# Patient Record
Sex: MALE | Race: OTHER | Employment: FULL TIME | URBAN - METROPOLITAN AREA
[De-identification: names, ages, dates, MRNs, and addresses within clinical notes are randomized per-mention and may not be internally consistent; named-entity substitution may affect disease eponyms.]

---

## 2024-04-14 ENCOUNTER — APPOINTMENT (OUTPATIENT)
Dept: CT IMAGING | Facility: HOSPITAL | Age: 54
End: 2024-04-14
Attending: EMERGENCY MEDICINE
Payer: COMMERCIAL

## 2024-04-14 ENCOUNTER — HOSPITAL ENCOUNTER (EMERGENCY)
Facility: HOSPITAL | Age: 54
Discharge: HOME | End: 2024-04-14
Attending: EMERGENCY MEDICINE
Payer: COMMERCIAL

## 2024-04-14 VITALS
HEART RATE: 56 BPM | HEIGHT: 68 IN | DIASTOLIC BLOOD PRESSURE: 91 MMHG | OXYGEN SATURATION: 98 % | SYSTOLIC BLOOD PRESSURE: 127 MMHG | RESPIRATION RATE: 12 BRPM | BODY MASS INDEX: 25.76 KG/M2 | TEMPERATURE: 98 F | WEIGHT: 170 LBS

## 2024-04-14 DIAGNOSIS — I10 HYPERTENSION, UNSPECIFIED TYPE: ICD-10-CM

## 2024-04-14 DIAGNOSIS — J01.90 ACUTE SINUSITIS, RECURRENCE NOT SPECIFIED, UNSPECIFIED LOCATION: Primary | ICD-10-CM

## 2024-04-14 LAB
ALBUMIN SERPL-MCNC: 3.9 G/DL (ref 3.4–5)
ALBUMIN/GLOB SERPL: 1 {RATIO} (ref 1–2)
ALP LIVER SERPL-CCNC: 88 U/L
ALT SERPL-CCNC: 26 U/L
ANION GAP SERPL CALC-SCNC: 6 MMOL/L (ref 0–18)
AST SERPL-CCNC: 21 U/L (ref 15–37)
ATRIAL RATE: 56 BPM
BASOPHILS # BLD AUTO: 0.08 X10(3) UL (ref 0–0.2)
BASOPHILS NFR BLD AUTO: 0.5 %
BILIRUB SERPL-MCNC: 0.5 MG/DL (ref 0.1–2)
BUN BLD-MCNC: 11 MG/DL (ref 9–23)
CALCIUM BLD-MCNC: 10.5 MG/DL (ref 8.5–10.1)
CHLORIDE SERPL-SCNC: 109 MMOL/L (ref 98–112)
CO2 SERPL-SCNC: 23 MMOL/L (ref 21–32)
CREAT BLD-MCNC: 1.07 MG/DL
EGFRCR SERPLBLD CKD-EPI 2021: 82 ML/MIN/1.73M2 (ref 60–?)
EOSINOPHIL # BLD AUTO: 0.08 X10(3) UL (ref 0–0.7)
EOSINOPHIL NFR BLD AUTO: 0.5 %
ERYTHROCYTE [DISTWIDTH] IN BLOOD BY AUTOMATED COUNT: 12.8 %
GLOBULIN PLAS-MCNC: 4.1 G/DL (ref 2.8–4.4)
GLUCOSE BLD-MCNC: 113 MG/DL (ref 70–99)
HCT VFR BLD AUTO: 43.8 %
HGB BLD-MCNC: 14.8 G/DL
IMM GRANULOCYTES # BLD AUTO: 0.05 X10(3) UL (ref 0–1)
IMM GRANULOCYTES NFR BLD: 0.3 %
LYMPHOCYTES # BLD AUTO: 1.19 X10(3) UL (ref 1–4)
LYMPHOCYTES NFR BLD AUTO: 7.9 %
MCH RBC QN AUTO: 28.7 PG (ref 26–34)
MCHC RBC AUTO-ENTMCNC: 33.8 G/DL (ref 31–37)
MCV RBC AUTO: 84.9 FL
MONOCYTES # BLD AUTO: 0.64 X10(3) UL (ref 0.1–1)
MONOCYTES NFR BLD AUTO: 4.3 %
NEUTROPHILS # BLD AUTO: 12.98 X10 (3) UL (ref 1.5–7.7)
NEUTROPHILS # BLD AUTO: 12.98 X10(3) UL (ref 1.5–7.7)
NEUTROPHILS NFR BLD AUTO: 86.5 %
OSMOLALITY SERPL CALC.SUM OF ELEC: 286 MOSM/KG (ref 275–295)
P AXIS: 71 DEGREES
P-R INTERVAL: 158 MS
PLATELET # BLD AUTO: 278 10(3)UL (ref 150–450)
POTASSIUM SERPL-SCNC: 4.2 MMOL/L (ref 3.5–5.1)
PROT SERPL-MCNC: 8 G/DL (ref 6.4–8.2)
Q-T INTERVAL: 410 MS
QRS DURATION: 88 MS
QTC CALCULATION (BEZET): 395 MS
R AXIS: 65 DEGREES
RBC # BLD AUTO: 5.16 X10(6)UL
SODIUM SERPL-SCNC: 138 MMOL/L (ref 136–145)
T AXIS: 36 DEGREES
VENTRICULAR RATE: 56 BPM
WBC # BLD AUTO: 15 X10(3) UL (ref 4–11)

## 2024-04-14 PROCEDURE — 93010 ELECTROCARDIOGRAM REPORT: CPT

## 2024-04-14 PROCEDURE — 93005 ELECTROCARDIOGRAM TRACING: CPT

## 2024-04-14 PROCEDURE — 96375 TX/PRO/DX INJ NEW DRUG ADDON: CPT

## 2024-04-14 PROCEDURE — 85025 COMPLETE CBC W/AUTO DIFF WBC: CPT | Performed by: EMERGENCY MEDICINE

## 2024-04-14 PROCEDURE — 96361 HYDRATE IV INFUSION ADD-ON: CPT

## 2024-04-14 PROCEDURE — 70450 CT HEAD/BRAIN W/O DYE: CPT | Performed by: EMERGENCY MEDICINE

## 2024-04-14 PROCEDURE — 80053 COMPREHEN METABOLIC PANEL: CPT | Performed by: EMERGENCY MEDICINE

## 2024-04-14 PROCEDURE — 99285 EMERGENCY DEPT VISIT HI MDM: CPT

## 2024-04-14 PROCEDURE — 96374 THER/PROPH/DIAG INJ IV PUSH: CPT

## 2024-04-14 RX ORDER — LISINOPRIL 10 MG/1
10 TABLET ORAL DAILY
Qty: 90 TABLET | Refills: 0 | Status: SHIPPED | OUTPATIENT
Start: 2024-04-14

## 2024-04-14 RX ORDER — DIPHENHYDRAMINE HYDROCHLORIDE 50 MG/ML
25 INJECTION INTRAMUSCULAR; INTRAVENOUS ONCE
Status: COMPLETED | OUTPATIENT
Start: 2024-04-14 | End: 2024-04-14

## 2024-04-14 RX ORDER — LISINOPRIL 20 MG/1
10 TABLET ORAL DAILY
Status: DISCONTINUED | OUTPATIENT
Start: 2024-04-14 | End: 2024-04-14

## 2024-04-14 RX ORDER — METOCLOPRAMIDE HYDROCHLORIDE 5 MG/ML
10 INJECTION INTRAMUSCULAR; INTRAVENOUS ONCE
Status: COMPLETED | OUTPATIENT
Start: 2024-04-14 | End: 2024-04-14

## 2024-04-14 RX ORDER — AMOXICILLIN AND CLAVULANATE POTASSIUM 875; 125 MG/1; MG/1
1 TABLET, FILM COATED ORAL 2 TIMES DAILY
Qty: 20 TABLET | Refills: 0 | Status: SHIPPED | OUTPATIENT
Start: 2024-04-14 | End: 2024-04-24

## 2024-04-14 NOTE — ED PROVIDER NOTES
Patient Seen in: [facility] Emergency Department      History     Chief Complaint   Patient presents with    HTN    Headache     Stated Complaint: HA and elevated /100    Subjective:   HPI    Patient is a 53-year-old male sent in from primary care office for evaluation of hypertension and headaches.  He had headaches for about 4 days.  Bifrontal.  No dizziness focal motor or sensory gait vision speech problems.  No nausea vomiting.  He is also noted to be hypertensive in the office no previous history of hypertension was sent to the ER for evaluation.  No chest pain or shortness of breath no other associate symptoms.  No other complaints    Objective:   History reviewed. No pertinent past medical history.           History reviewed. No pertinent surgical history.             Social History     Socioeconomic History    Marital status:    Tobacco Use    Smoking status: Every Day     Types: Cigarettes    Smokeless tobacco: Never   Substance and Sexual Activity    Alcohol use: Yes    Drug use: Yes     Types: Cannabis              Review of Systems    Positive for stated complaint: HA and elevated /100  Other systems are as noted in HPI.  Constitutional and vital signs reviewed.      All other systems reviewed and negative except as noted above.    Physical Exam     ED Triage Vitals [04/14/24 1120]   BP (!) 153/101   Pulse 97   Resp 16   Temp 98 °F (36.7 °C)   Temp src    SpO2 (!) 60 %   O2 Device None (Room air)       Current:BP (!) 127/91   Pulse 56   Temp 98 °F (36.7 °C)   Resp 12   Ht 172.7 cm (5' 8\")   Wt 77.1 kg   SpO2 98%   BMI 25.85 kg/m²         Physical Exam  Vitals and nursing note reviewed.   Constitutional:       General: He is not in acute distress.     Appearance: He is well-developed.   HENT:      Head: Normocephalic and atraumatic.      Mouth/Throat:      Pharynx: No oropharyngeal exudate.   Eyes:      General: No scleral icterus.     Conjunctiva/sclera: Conjunctivae  normal.      Pupils: Pupils are equal, round, and reactive to light.   Cardiovascular:      Rate and Rhythm: Normal rate and regular rhythm.      Heart sounds: No murmur heard.     No friction rub. No gallop.   Pulmonary:      Effort: Pulmonary effort is normal. No respiratory distress.      Breath sounds: Normal breath sounds. No stridor. No wheezing or rales.   Abdominal:      General: There is no distension.      Palpations: Abdomen is soft.      Tenderness: There is no abdominal tenderness. There is no guarding or rebound.   Musculoskeletal:         General: No tenderness. Normal range of motion.      Cervical back: Normal range of motion and neck supple.   Skin:     General: Skin is warm and dry.      Findings: No erythema or rash.   Neurological:      Mental Status: He is alert and oriented to person, place, and time.      Cranial Nerves: No cranial nerve deficit.      Motor: No abnormal muscle tone.      Coordination: Coordination normal.   Psychiatric:         Behavior: Behavior normal.              ED Course     Labs Reviewed   COMP METABOLIC PANEL (14) - Abnormal; Notable for the following components:       Result Value    Glucose 113 (*)     Calcium, Total 10.5 (*)     All other components within normal limits   CBC W/ DIFFERENTIAL - Abnormal; Notable for the following components:    WBC 15.0 (*)     Neutrophil Absolute Prelim 12.98 (*)     Neutrophil Absolute 12.98 (*)     All other components within normal limits   CBC WITH DIFFERENTIAL WITH PLATELET    Narrative:     The following orders were created for panel order CBC With Differential With Platelet.  Procedure                               Abnormality         Status                     ---------                               -----------         ------                     CBC W/ DIFFERENTIAL[[id number]]          Abnormal            Final result                 Please view results for these tests on the individual orders.   RAINBOW DRAW LAVENDER    RAINBOW DRAW LIGHT GREEN   RAINBOW DRAW BLUE     EKG    Rate, intervals and axes as noted on EKG Report.  Rate: 56  Rhythm: Sinus Rhythm  Reading: Sinus bradycardia otherwise normal EKG                           MDM          -Tracing on cardiac monitor and pulse oximetry was reviewed by myself.   -The cardiac monitor revealed normal sinus rhythm as interpreted by me. The cardiac monitor was ordered to monitor the patient for dysrhythmia  -Pulse oximetry was interpreted by me and was normal.  Pulse oximeter was ordered to monitor patient for hypoxia.        -History source other than patient -wife        -Comorbidities did add complexity to the management are mentioned in the HPI above        -I personally reviewed the prior external notes and the medical record to obtain additional history patient accompany notes from PCP office.  He is hypertensive with headache advised to come to the ED for evaluation-        -DDX: Includes but not limited to -intracranial hemorrhage, space-occupying lesion, sinusitis, hypertensive urgency           -I personally reviewed the CT findings and it shows sinusitis  Please refer to radiology report for official interpretation          CT BRAIN OR HEAD ([id number])   Final Result                     =====   PROCEDURE:  CT BRAIN OR HEAD ([id number])       COMPARISON:  None.       INDICATIONS:  HA and elevated /100       TECHNIQUE:  Noncontrast CT scanning is performed through the brain. Dose    reduction techniques were used. Dose information is transmitted to the ACR    (American College of Radiology) NRDR (National Radiology Data Registry)    which includes the Dose Index    Registry.       PATIENT STATED HISTORY: (As transcribed by Technologist)  Patient has had    headache past 4 days with elevated blood pressure.            FINDINGS:   No evidence of hemorrhage or extra-axial fluid collection.       Supra and infratentorial brain parenchyma are unremarkable.       Ventricles and sulci  are appropriate for the patient's age.       No mass effect.       Marked opacification of the paranasal sinuses.       Visualized portions of mastoid air cells are unremarkable.       Visualized portions of orbits are unremarkable.       IMPRESSION:   Marked opacification of the paranasal sinuses.  Overall, marked    opacification of the left maxillary, bilateral ethmoid air cells left    frontal sinus along with moderate opacification of right frontal sinus and    right maxillary sinus.       No evidence of an acute intracranial abnormality.               LOCATION:  [facility]           Dictated by (CST): Dean Alejandre MD on 4/14/2024 at 1:09 PM        Finalized by (CST): Dean Alejandre MD on 4/14/2024 at 1:10 PM           Medications   metoclopramide (Reglan) 5 mg/mL injection 10 mg (10 mg Intravenous Given 4/14/24 1136)   diphenhydrAMINE (Benadryl) 50 mg/mL  injection 25 mg (25 mg Intravenous Given 4/14/24 1136)   sodium chloride 0.9 % IV bolus 1,000 mL (0 mL Intravenous Stopped 4/14/24 1236)       Labs Reviewed   COMP METABOLIC PANEL (14) - Abnormal; Notable for the following components:       Result Value    Glucose 113 (*)     Calcium, Total 10.5 (*)     All other components within normal limits   CBC W/ DIFFERENTIAL - Abnormal; Notable for the following components:    WBC 15.0 (*)     Neutrophil Absolute Prelim 12.98 (*)     Neutrophil Absolute 12.98 (*)     All other components within normal limits   CBC WITH DIFFERENTIAL WITH PLATELET    Narrative:     The following orders were created for panel order CBC With Differential With Platelet.  Procedure                               Abnormality         Status                     ---------                               -----------         ------                     CBC W/ DIFFERENTIAL[[id number]]          Abnormal            Final result                 Please view results for these tests on the individual orders.   RAINBOW DRAW LAVENDER   RAINBOW DRAW LIGHT  GREEN   RAINBOW DRAW BLUE     White count 15,000.  Glucose 113.  Otherwise CBC CMP are normal.  Patient doing better after medication administration.  Blood pressure now in the 120s over 90.  Workup showing sinusitis.  He will be started on antihypertensives for now and follow-up with PCP.  Augmentin for 10 days.  Demonstrates understanding.  Discharged home stable condition                                   Medical Decision Making      Disposition and Plan     Clinical Impression:  1. Acute sinusitis, recurrence not specified, unspecified location    2. Hypertension, unspecified type         Disposition:  Discharge  4/14/2024  1:50 pm    Follow-up:  [facility]  [address]  [facility] [id number]  Follow up            Medications Prescribed:  Current Discharge Medication List        START taking these medications    Details   amoxicillin clavulanate 875-125 MG Oral Tab Take 1 tablet by mouth 2 (two) times daily for 10 days.  Qty: 20 tablet, Refills: 0      lisinopril 10 MG Oral Tab Take 1 tablet (10 mg total) by mouth daily.  Qty: 90 tablet, Refills: 0

## 2024-04-14 NOTE — ED INITIAL ASSESSMENT (HPI)
Pt presents with complaints of a intermittent headache x4 days. Treated with ibuprofen with no relief.  Denies dizzyness or vision changes. Pt went to IC, BP was elevated and sent to ER for further workup. Denies CP. Pt ambulatory, alert and oriented, respiration regular and unlabored, skin warm and dry.